# Patient Record
Sex: MALE | Race: WHITE | NOT HISPANIC OR LATINO | Employment: FULL TIME | URBAN - METROPOLITAN AREA
[De-identification: names, ages, dates, MRNs, and addresses within clinical notes are randomized per-mention and may not be internally consistent; named-entity substitution may affect disease eponyms.]

---

## 2018-07-13 ENCOUNTER — PATIENT MESSAGE (OUTPATIENT)
Dept: FAMILY MEDICINE | Facility: CLINIC | Age: 23
End: 2018-07-13

## 2018-07-13 DIAGNOSIS — I86.1 VARICOCELE: Primary | ICD-10-CM

## 2018-07-27 ENCOUNTER — OFFICE VISIT (OUTPATIENT)
Dept: UROLOGY | Facility: CLINIC | Age: 23
End: 2018-07-27
Payer: COMMERCIAL

## 2018-07-27 VITALS
HEART RATE: 64 BPM | HEIGHT: 75 IN | SYSTOLIC BLOOD PRESSURE: 133 MMHG | WEIGHT: 179.69 LBS | BODY MASS INDEX: 22.34 KG/M2 | DIASTOLIC BLOOD PRESSURE: 82 MMHG

## 2018-07-27 DIAGNOSIS — I86.1 VARICOCELE: Primary | ICD-10-CM

## 2018-07-27 LAB
BILIRUB SERPL-MCNC: NORMAL MG/DL
BLOOD URINE, POC: NORMAL
COLOR, POC UA: YELLOW
GLUCOSE UR QL STRIP: NORMAL
KETONES UR QL STRIP: NORMAL
LEUKOCYTE ESTERASE URINE, POC: NORMAL
NITRITE, POC UA: NORMAL
PH, POC UA: 6
PROTEIN, POC: NORMAL
SPECIFIC GRAVITY, POC UA: >1.03
UROBILINOGEN, POC UA: NORMAL

## 2018-07-27 PROCEDURE — 81002 URINALYSIS NONAUTO W/O SCOPE: CPT | Mod: S$GLB,,, | Performed by: UROLOGY

## 2018-07-27 PROCEDURE — 99204 OFFICE O/P NEW MOD 45 MIN: CPT | Mod: 25,S$GLB,, | Performed by: UROLOGY

## 2018-07-27 PROCEDURE — 3008F BODY MASS INDEX DOCD: CPT | Mod: CPTII,S$GLB,, | Performed by: UROLOGY

## 2018-07-27 PROCEDURE — 99999 PR PBB SHADOW E&M-EST. PATIENT-LVL III: CPT | Mod: PBBFAC,,, | Performed by: UROLOGY

## 2018-07-27 NOTE — PROGRESS NOTES
[facility]Y [place]  7 27 18    Cc varicocele    Age 22 , comes in that he has a varicocele that 'has begun to bother him'. Says he was diagnosed during high school with varicocele but it rarely caused any discomfort. Over the years he notices more pressure feeling on the L hemiscrotum, especially if he has long car rides or if he stands for a long time. The discomfort is a dull pressure feeling, and not any stinging pain.    Has no voiding or otherwise any urologic problems.      PMH    Surgical:  has a past surgical history that includes Circumcision.    Medical:  has a past medical history of Varicocele.    Familial: no fh of renal stones    Social: single, lives in [place]    Meds: None    REVIEW OF SYSTEMS  GENERAL: No headaches or dizzy spells.   HEENT: vision preserved. Sinuses: No complaints.   CARDIOPULMONARY: No swelling of the legs; no chest pain. No shortness of breath, no wheezing.   GASTROINTESTINAL: No heartburn, diarrhea, constipation  GENITOURINARY: Denies dysuria, bleeding or incontinence.   MUSCULOSKELETAL: No arthritic complaints such as pain or stiffness.   PSYCHIATRIC: No history of depression or anxiety.   ENDOCRINOLOGIC: No reports of sweating, cold or heat intolerance. No polyuria or polydipsia.   NEUROLOGICAL: No dizziness, syncope, paralysis  LYMPHATICS: No enlarged nodes. No history of splenectomy.  ==    Pt alert, oriented, no distress  HEENT: wnl.  Neck: supple, no JVD, no lymphadenopathy  Chest: CV NSR  Lungs: normal chest expansion, no labored breathing  Abdomen flat, markedly athletic, nontender, no organomegaly, no masses.  No hernias  Penis circumcised, meatus nl  Testes normal bilaterally, epididymides normal bilaterally. The L hemiscrotum has dilated veins, visible and bulging; coughing shows the valsalva pressure effect on the veins by palpation; nontender. No skin changes. Veins are distended in orthostatism but the venous bulge disappears in recumbency.  Extremities: no edema,  peripheral pulses nl  Neuro: preserved    IMP  L varicocele  We discussed possible surgical correction; we would access the vein inguinally for ligation; pt understands and will let us know. In the meantime, pt can wear a supportive underwear.

## 2018-10-09 ENCOUNTER — TELEPHONE (OUTPATIENT)
Dept: FAMILY MEDICINE | Facility: CLINIC | Age: 23
End: 2018-10-09

## 2018-10-09 NOTE — TELEPHONE ENCOUNTER
Phoned pt in regards to message. Do not see any notations that pt would need to R/S. Instructed pt that he would receive a call in regards to R/S appt. If he did not hear from us then he would keep the scheduled appt on 10/31/18. Pt voiced understanding. CLC

## 2018-10-09 NOTE — TELEPHONE ENCOUNTER
----- Message from Doris Richard sent at 10/8/2018  1:54 PM CDT -----  Contact: Self  Patient was  told to call back and get on an afternoon schedule for his appt on October 31 instead of the morning   No appts available in the afternoon   Please call back [phone]

## 2018-10-31 ENCOUNTER — OFFICE VISIT (OUTPATIENT)
Dept: FAMILY MEDICINE | Facility: CLINIC | Age: 23
End: 2018-10-31
Payer: COMMERCIAL

## 2018-10-31 VITALS
SYSTOLIC BLOOD PRESSURE: 104 MMHG | BODY MASS INDEX: 22.29 KG/M2 | HEIGHT: 75 IN | WEIGHT: 179.25 LBS | DIASTOLIC BLOOD PRESSURE: 72 MMHG | HEART RATE: 62 BPM

## 2018-10-31 DIAGNOSIS — Z00.00 ROUTINE HEALTH MAINTENANCE: Primary | ICD-10-CM

## 2018-10-31 PROCEDURE — 99395 PREV VISIT EST AGE 18-39: CPT | Mod: S$GLB,,, | Performed by: FAMILY MEDICINE

## 2018-10-31 PROCEDURE — 99999 PR PBB SHADOW E&M-EST. PATIENT-LVL III: CPT | Mod: PBBFAC,,, | Performed by: FAMILY MEDICINE

## 2018-10-31 NOTE — PROGRESS NOTES
HPI  Andreas Rogers is a 23 y.o. male with multiple medical diagnoses as listed in the medical history and problem list that presents for Annual Exam  .      HPI  Here today for routine health maintenance.    PAST MEDICAL HISTORY:  Past Medical History:   Diagnosis Date    Varicocele        PAST SURGICAL HISTORY:  Past Surgical History:   Procedure Laterality Date    CIRCUMCISION         SOCIAL HISTORY:  Social History     Socioeconomic History    Marital status: Single     Spouse name: Not on file    Number of children: Not on file    Years of education: Not on file    Highest education level: Not on file   Social Needs    Financial resource strain: Not on file    Food insecurity - worry: Not on file    Food insecurity - inability: Not on file    Transportation needs - medical: Not on file    Transportation needs - non-medical: Not on file   Occupational History    Not on file   Tobacco Use    Smoking status: Never Smoker    Smokeless tobacco: Never Used   Substance and Sexual Activity    Alcohol use: Yes     Alcohol/week: 2.4 oz     Types: 4 Cans of beer per week     Comment: Occasional    Drug use: Not on file    Sexual activity: Not on file   Other Topics Concern    Not on file   Social History Narrative    Not on file       FAMILY HISTORY:  Family History   Problem Relation Age of Onset    Kidney cancer Maternal Grandfather     Hypertension Paternal Grandmother     Cancer Paternal Grandfather     Nephrolithiasis Neg Hx        ALLERGIES AND MEDICATIONS: updated and reviewed.  Review of patient's allergies indicates:   Allergen Reactions    Penicillins Hives     No current outpatient medications on file.     No current facility-administered medications for this visit.        ROS  Review of Systems   Constitutional: Negative for activity change and unexpected weight change.   HENT: Negative for hearing loss, rhinorrhea and trouble swallowing.    Eyes: Negative for discharge and visual  "disturbance.   Respiratory: Negative for chest tightness and wheezing.    Cardiovascular: Negative for chest pain and palpitations.   Gastrointestinal: Negative for blood in stool, constipation, diarrhea and vomiting.   Endocrine: Negative for polydipsia and polyuria.   Genitourinary: Negative for difficulty urinating, hematuria and urgency.        Has seen Urology for varicocele   Musculoskeletal: Negative for arthralgias, joint swelling and neck pain.   Neurological: Negative for weakness and headaches.   Psychiatric/Behavioral: Negative for confusion and dysphoric mood.       Physical Exam  Vitals:    10/31/18 0835   BP: 104/72   Pulse: 62    Body mass index is 22.4 kg/m².  Weight: 81.3 kg (179 lb 3.7 oz)   Height: 6' 3" (190.5 cm)     Physical Exam   Constitutional: He is oriented to person, place, and time. He appears well-developed and well-nourished. No distress.   HENT:   Head: Normocephalic and atraumatic.   Right Ear: External ear normal.   Left Ear: External ear normal.   Eyes: Conjunctivae and EOM are normal. Pupils are equal, round, and reactive to light. No scleral icterus.   Neck: Normal range of motion. Neck supple. No JVD present. No thyromegaly present.   Cardiovascular: Normal rate, regular rhythm and intact distal pulses. Exam reveals no gallop and no friction rub.   No murmur heard.  Pulmonary/Chest: Effort normal and breath sounds normal. He has no wheezes. He has no rales.   Abdominal: Soft. Bowel sounds are normal. He exhibits no distension and no mass. There is no tenderness.   Musculoskeletal: Normal range of motion. He exhibits no edema or tenderness.   Lymphadenopathy:     He has no cervical adenopathy.   Neurological: He is alert and oriented to person, place, and time. No cranial nerve deficit. Coordination normal.   Skin: Skin is warm and dry. No rash noted.   Psychiatric: He has a normal mood and affect.   Vitals reviewed.      Health Maintenance       Date Due Completion Date    " TETANUS VACCINE 07/29/2024 7/29/2014          Assessment & Plan    Routine health maintenance  - Health maintenance reviewed  - Diet and exercise education.        Follow-up in about 2 years (around 10/31/2020).

## 2020-10-05 ENCOUNTER — PATIENT MESSAGE (OUTPATIENT)
Dept: ADMINISTRATIVE | Facility: HOSPITAL | Age: 25
End: 2020-10-05

## 2021-01-04 ENCOUNTER — PATIENT MESSAGE (OUTPATIENT)
Dept: ADMINISTRATIVE | Facility: HOSPITAL | Age: 26
End: 2021-01-04

## 2021-04-29 ENCOUNTER — PATIENT MESSAGE (OUTPATIENT)
Dept: RESEARCH | Facility: HOSPITAL | Age: 26
End: 2021-04-29

## 2021-07-07 ENCOUNTER — PATIENT MESSAGE (OUTPATIENT)
Dept: ADMINISTRATIVE | Facility: HOSPITAL | Age: 26
End: 2021-07-07

## 2021-11-15 ENCOUNTER — OFFICE VISIT (OUTPATIENT)
Dept: URGENT CARE | Facility: CLINIC | Age: 26
End: 2021-11-15
Payer: COMMERCIAL

## 2021-11-15 VITALS
DIASTOLIC BLOOD PRESSURE: 74 MMHG | SYSTOLIC BLOOD PRESSURE: 125 MMHG | WEIGHT: 201.19 LBS | TEMPERATURE: 99 F | RESPIRATION RATE: 18 BRPM | HEART RATE: 84 BPM | OXYGEN SATURATION: 99 % | HEIGHT: 75 IN | BODY MASS INDEX: 25.02 KG/M2

## 2021-11-15 DIAGNOSIS — Z11.52 ENCOUNTER FOR SCREENING LABORATORY TESTING FOR COVID-19 VIRUS: Primary | ICD-10-CM

## 2021-11-15 PROCEDURE — 99202 OFFICE O/P NEW SF 15 MIN: CPT | Mod: S$GLB,,, | Performed by: NURSE PRACTITIONER

## 2021-11-15 PROCEDURE — 99202 PR OFFICE/OUTPT VISIT, NEW, LEVL II, 15-29 MIN: ICD-10-PCS | Mod: S$GLB,,, | Performed by: NURSE PRACTITIONER

## 2021-11-15 PROCEDURE — U0003 INFECTIOUS AGENT DETECTION BY NUCLEIC ACID (DNA OR RNA); SEVERE ACUTE RESPIRATORY SYNDROME CORONAVIRUS 2 (SARS-COV-2) (CORONAVIRUS DISEASE [COVID-19]), AMPLIFIED PROBE TECHNIQUE, MAKING USE OF HIGH THROUGHPUT TECHNOLOGIES AS DESCRIBED BY CMS-2020-01-R: HCPCS | Performed by: NURSE PRACTITIONER

## 2021-11-15 PROCEDURE — U0005 INFEC AGEN DETEC AMPLI PROBE: HCPCS | Performed by: NURSE PRACTITIONER

## 2021-11-16 LAB — SARS-COV-2 RNA RESP QL NAA+PROBE: NOT DETECTED

## 2022-01-06 ENCOUNTER — OFFICE VISIT (OUTPATIENT)
Dept: FAMILY MEDICINE | Facility: CLINIC | Age: 27
End: 2022-01-06
Payer: COMMERCIAL

## 2022-01-06 VITALS
OXYGEN SATURATION: 100 % | BODY MASS INDEX: 24.37 KG/M2 | DIASTOLIC BLOOD PRESSURE: 78 MMHG | TEMPERATURE: 98 F | HEIGHT: 75 IN | RESPIRATION RATE: 18 BRPM | SYSTOLIC BLOOD PRESSURE: 126 MMHG | WEIGHT: 196 LBS | HEART RATE: 83 BPM

## 2022-01-06 DIAGNOSIS — F10.10 ALCOHOL CONSUMPTION BINGE DRINKING: ICD-10-CM

## 2022-01-06 DIAGNOSIS — R13.14 PHARYNGOESOPHAGEAL DYSPHAGIA: ICD-10-CM

## 2022-01-06 DIAGNOSIS — Z00.00 ANNUAL PHYSICAL EXAM: Primary | ICD-10-CM

## 2022-01-06 DIAGNOSIS — I86.1 VARICOCELE: ICD-10-CM

## 2022-01-06 PROCEDURE — 99395 PREV VISIT EST AGE 18-39: CPT | Mod: S$GLB,,, | Performed by: INTERNAL MEDICINE

## 2022-01-06 PROCEDURE — 99395 PR PREVENTIVE VISIT,EST,18-39: ICD-10-PCS | Mod: S$GLB,,, | Performed by: INTERNAL MEDICINE

## 2022-01-06 NOTE — PATIENT INSTRUCTIONS
If you have labs scheduled at [facility] you need to make an appointment. This is a measure to protect you from covid 19.      Thank you for choosing [facility].     Please fill out the patient experience survey.

## 2022-01-06 NOTE — PROGRESS NOTES
Subjective:      9:16 AM     Patient ID: Andreas Rogers is a 26 y.o. male.    Chief Complaint: wellness and Annual Exam    HPI     Biggest missed medical opportunity according to the patient: binge drinking    Smoking: no  Alcohol:  Sometimes goes to parties on weekend and will drink fiber 6 beers  Street drugs:no    Diet issues:no    Exercise:  Yes    Mood:  Good    Sadness: no    Anhedonia: no    Sleep: good, but works shift work  Snoring: no    Brush teeth:  yes  Floss teeth: no    Wears seatbelts: yes    Abused: no    Advanced care planning: no    CHIEF :COMPLAINT:  Painful varicocele  HPI:  Has had a varicocele since his teens  ONSET:    2 months    ago.   DURATION: . intermittant after lifting  QUALITY/COURSE: .  Improving  LOCATION:  Left scrotum  INTENSITY/SEVERITY: # 6/10 initially, now 1 (on 1 to 10 scale)  CONTEXT/WHEN:          MODIFIERS/TREATMENTS: Taking Medications: ? .     Compliant with Taking Prescribed Medications: ? yes  . Prior X-Rays: no  Prior Labs: no    Movements, medications or activities that worsen the problem:       Movements, medications, or activities that ease the problem:      The below section is positive for the patient ONLY if BOLDED:    SYMPTOMS/RELATED:  Dysuria.  Flank pain.  Nausea.  Vomiting.        Patient has also experienced 1 episode of dysphagia to solids worry could breathe but was chart choking.  Felt was stuck on the left side.        Review of Systems   Constitutional: Negative for fatigue, fever and unexpected weight change.   HENT: Negative for dental problem, hearing loss, nosebleeds, rhinorrhea, tinnitus, trouble swallowing and voice change.    Eyes: Negative for itching and visual disturbance.   Respiratory: Negative for cough, shortness of breath and wheezing.    Cardiovascular: Negative for chest pain and palpitations.   Gastrointestinal: Negative for abdominal pain, blood in stool, constipation, diarrhea, nausea and vomiting.   Endocrine: Negative for cold  "intolerance, heat intolerance, polydipsia and polyphagia.   Genitourinary: Negative for difficulty urinating and dysuria.   Musculoskeletal: Negative for arthralgias.   Allergic/Immunologic: Negative for environmental allergies and immunocompromised state.   Neurological: Negative for dizziness, seizures, weakness, numbness and headaches.   Hematological: Does not bruise/bleed easily.   Psychiatric/Behavioral: Negative for agitation, dysphoric mood, sleep disturbance and suicidal ideas. The patient is not nervous/anxious.          Objective:      Vitals:    01/06/22 0911   BP: 126/78   Pulse: 83   Resp: 18   Temp: 98.4 °F (36.9 °C)   SpO2: 100%   Weight: 88.9 kg (196 lb)   Height: 6' 3" (1.905 m)   PainSc: 0-No pain     Physical Exam  Vitals and nursing note reviewed.   Constitutional:       Appearance: He is well-developed and well-nourished.   HENT:      Right Ear: Tympanic membrane and external ear normal.      Left Ear: Tympanic membrane and external ear normal.      Mouth/Throat:      Mouth: Oropharynx is clear and moist.      Pharynx: No oropharyngeal exudate.   Eyes:      Pupils: Pupils are equal, round, and reactive to light.   Cardiovascular:      Rate and Rhythm: Normal rate and regular rhythm.      Heart sounds: Normal heart sounds. No murmur heard.  No gallop.    Pulmonary:      Effort: Pulmonary effort is normal.      Breath sounds: Normal breath sounds. No wheezing or rales.   Chest:      Chest wall: No tenderness.   Abdominal:      Palpations: Abdomen is soft.      Tenderness: There is no abdominal tenderness.   Genitourinary:     Comments: Large nontender varicocele of the left scrotum.  Testes are normal  Musculoskeletal:         General: No edema.   Lymphadenopathy:      Cervical: No cervical adenopathy.   Neurological:      Mental Status: He is alert.   Psychiatric:         Mood and Affect: Mood and affect normal.         Behavior: Behavior normal.         Thought Content: Thought content normal. "       No results found for this or any previous visit (from the past 1008 hour(s)).       Assessment:       1. Annual physical exam    2. Varicocele    3. Pharyngoesophageal dysphagia    4. Alcohol consumption binge drinking          Plan:       Annual physical exam    Varicocele  -     Ambulatory referral/consult to Urology; Future; Expected date: 01/13/2022    Pharyngoesophageal dysphagia  -     Ambulatory referral/consult to Gastroenterology; Future; Expected date: 01/13/2022    Alcohol consumption binge drinking      No follow-ups on file.

## 2022-01-24 ENCOUNTER — PATIENT MESSAGE (OUTPATIENT)
Dept: UROLOGY | Facility: CLINIC | Age: 27
End: 2022-01-24
Payer: COMMERCIAL

## 2022-01-25 ENCOUNTER — LAB VISIT (OUTPATIENT)
Dept: LAB | Facility: HOSPITAL | Age: 27
End: 2022-01-25
Payer: COMMERCIAL

## 2022-01-25 ENCOUNTER — OFFICE VISIT (OUTPATIENT)
Dept: GASTROENTEROLOGY | Facility: CLINIC | Age: 27
End: 2022-01-25
Payer: COMMERCIAL

## 2022-01-25 VITALS
SYSTOLIC BLOOD PRESSURE: 126 MMHG | HEART RATE: 64 BPM | WEIGHT: 196.63 LBS | BODY MASS INDEX: 24.58 KG/M2 | DIASTOLIC BLOOD PRESSURE: 78 MMHG

## 2022-01-25 DIAGNOSIS — R13.10 DYSPHAGIA, UNSPECIFIED TYPE: Primary | ICD-10-CM

## 2022-01-25 DIAGNOSIS — R12 HEARTBURN: ICD-10-CM

## 2022-01-25 DIAGNOSIS — R13.14 PHARYNGOESOPHAGEAL DYSPHAGIA: ICD-10-CM

## 2022-01-25 LAB
ALBUMIN SERPL BCP-MCNC: 4.6 G/DL (ref 3.5–5.2)
ALP SERPL-CCNC: 70 U/L (ref 55–135)
ALT SERPL W/O P-5'-P-CCNC: 20 U/L (ref 10–44)
ANION GAP SERPL CALC-SCNC: 11 MMOL/L (ref 8–16)
AST SERPL-CCNC: 15 U/L (ref 10–40)
BASOPHILS # BLD AUTO: 0.02 K/UL (ref 0–0.2)
BASOPHILS NFR BLD: 0.4 % (ref 0–1.9)
BILIRUB SERPL-MCNC: 0.6 MG/DL (ref 0.1–1)
BUN SERPL-MCNC: 15 MG/DL (ref 6–20)
CALCIUM SERPL-MCNC: 9.9 MG/DL (ref 8.7–10.5)
CHLORIDE SERPL-SCNC: 105 MMOL/L (ref 95–110)
CO2 SERPL-SCNC: 25 MMOL/L (ref 23–29)
CREAT SERPL-MCNC: 1.1 MG/DL (ref 0.5–1.4)
DIFFERENTIAL METHOD: NORMAL
EOSINOPHIL # BLD AUTO: 0.3 K/UL (ref 0–0.5)
EOSINOPHIL NFR BLD: 4.6 % (ref 0–8)
ERYTHROCYTE [DISTWIDTH] IN BLOOD BY AUTOMATED COUNT: 11.8 % (ref 11.5–14.5)
EST. GFR  (AFRICAN AMERICAN): >60 ML/MIN/1.73 M^2
EST. GFR  (NON AFRICAN AMERICAN): >60 ML/MIN/1.73 M^2
GLUCOSE SERPL-MCNC: 100 MG/DL (ref 70–110)
HCT VFR BLD AUTO: 45.1 % (ref 40–54)
HGB BLD-MCNC: 15 G/DL (ref 14–18)
IMM GRANULOCYTES # BLD AUTO: 0.01 K/UL (ref 0–0.04)
IMM GRANULOCYTES NFR BLD AUTO: 0.2 % (ref 0–0.5)
LYMPHOCYTES # BLD AUTO: 2.1 K/UL (ref 1–4.8)
LYMPHOCYTES NFR BLD: 37.5 % (ref 18–48)
MCH RBC QN AUTO: 30.5 PG (ref 27–31)
MCHC RBC AUTO-ENTMCNC: 33.3 G/DL (ref 32–36)
MCV RBC AUTO: 92 FL (ref 82–98)
MONOCYTES # BLD AUTO: 0.5 K/UL (ref 0.3–1)
MONOCYTES NFR BLD: 8.5 % (ref 4–15)
NEUTROPHILS # BLD AUTO: 2.8 K/UL (ref 1.8–7.7)
NEUTROPHILS NFR BLD: 48.8 % (ref 38–73)
NRBC BLD-RTO: 0 /100 WBC
PLATELET # BLD AUTO: 352 K/UL (ref 150–450)
PMV BLD AUTO: 9.6 FL (ref 9.2–12.9)
POTASSIUM SERPL-SCNC: 4 MMOL/L (ref 3.5–5.1)
PROT SERPL-MCNC: 7.4 G/DL (ref 6–8.4)
RBC # BLD AUTO: 4.91 M/UL (ref 4.6–6.2)
SODIUM SERPL-SCNC: 141 MMOL/L (ref 136–145)
WBC # BLD AUTO: 5.65 K/UL (ref 3.9–12.7)

## 2022-01-25 PROCEDURE — 99204 OFFICE O/P NEW MOD 45 MIN: CPT | Mod: S$GLB,,,

## 2022-01-25 PROCEDURE — 85025 COMPLETE CBC W/AUTO DIFF WBC: CPT

## 2022-01-25 PROCEDURE — 99999 PR PBB SHADOW E&M-EST. PATIENT-LVL IV: ICD-10-PCS | Mod: PBBFAC,,,

## 2022-01-25 PROCEDURE — 99204 PR OFFICE/OUTPT VISIT, NEW, LEVL IV, 45-59 MIN: ICD-10-PCS | Mod: S$GLB,,,

## 2022-01-25 PROCEDURE — 36415 COLL VENOUS BLD VENIPUNCTURE: CPT

## 2022-01-25 PROCEDURE — 80053 COMPREHEN METABOLIC PANEL: CPT

## 2022-01-25 PROCEDURE — 99999 PR PBB SHADOW E&M-EST. PATIENT-LVL IV: CPT | Mod: PBBFAC,,,

## 2022-01-25 RX ORDER — OMEPRAZOLE 40 MG/1
40 CAPSULE, DELAYED RELEASE ORAL DAILY
Qty: 30 CAPSULE | Refills: 0 | Status: SHIPPED | OUTPATIENT
Start: 2022-01-25 | End: 2022-02-16

## 2022-01-25 NOTE — H&P (VIEW-ONLY)
Subjective:       Patient ID: Andreas Rogers is a 26 y.o. male Body mass index is 24.58 kg/m².    Chief Complaint: Gastroesophageal Reflux    This patient is new to me.  Referring Provider:  Dr. Rogers for pharyngoesophageal dysphagia.     Gastroesophageal Reflux  He complains of dysphagia (occurs with solid foods and meat; feels as though food is stuck in throat; reports having 1 episode this month while eating) and heartburn (rare). He reports no abdominal pain, no belching, no chest pain, no choking, no coughing, no early satiety, no globus sensation, no hoarse voice, no nausea, no sore throat, no stridor, no water brash or no wheezing. This is a chronic (started over a year ago) problem. Episode onset: has worsened the past 2-3 months. The problem occurs occasionally. The problem has been gradually worsening. The heartburn duration is several minutes. The heartburn is located in the substernum. The heartburn is of mild intensity. The heartburn does not wake him from sleep. The heartburn does not limit his activity. The heartburn doesn't change with position. The symptoms are aggravated by certain foods (worse after eating greasy or spicy). Pertinent negatives include no anemia, fatigue, melena, muscle weakness, orthopnea or weight loss. Risk factors include NSAIDs (currently taking Advil/Aleve OTC PRN 1-2 times month). He has tried nothing for the symptoms. Past procedures do not include an abdominal ultrasound, an EGD, esophageal manometry, esophageal pH monitoring, H. pylori antibody titer or a UGI. Past invasive treatments do not include gastroplasty, gastroplication or reflux surgery.     Review of Systems   Constitutional: Negative for activity change, appetite change, chills, diaphoresis, fatigue, fever, unexpected weight change and weight loss.   HENT: Positive for trouble swallowing. Negative for hoarse voice and sore throat.    Respiratory: Negative for cough, choking, shortness of breath and wheezing.     Cardiovascular: Negative for chest pain.   Gastrointestinal: Positive for dysphagia (occurs with solid foods and meat; feels as though food is stuck in throat; reports having 1 episode this month while eating) and heartburn (rare). Negative for abdominal distention, abdominal pain, anal bleeding, blood in stool, constipation, diarrhea, melena, nausea, rectal pain and vomiting.   Musculoskeletal: Negative for muscle weakness.       No LMP for male patient.  Past Medical History:   Diagnosis Date    Varicocele      Past Surgical History:   Procedure Laterality Date    CIRCUMCISION       Family History   Problem Relation Age of Onset    Kidney cancer Maternal Grandfather     Hypertension Paternal Grandmother     Cancer Paternal Grandfather     Nephrolithiasis Neg Hx     Colon cancer Neg Hx     Colon polyps Neg Hx     Crohn's disease Neg Hx     Ulcerative colitis Neg Hx     Stomach cancer Neg Hx     Rectal cancer Neg Hx      Social History     Tobacco Use    Smoking status: Never Smoker    Smokeless tobacco: Never Used   Substance Use Topics    Alcohol use: Yes     Alcohol/week: 4.0 standard drinks     Types: 4 Cans of beer per week     Comment: Occasional     Wt Readings from Last 10 Encounters:   01/25/22 89.2 kg (196 lb 10.4 oz)   01/06/22 88.9 kg (196 lb)   11/15/21 91.3 kg (201 lb 3.2 oz)   10/31/18 81.3 kg (179 lb 3.7 oz)   07/27/18 81.5 kg (179 lb 11.2 oz)   08/30/16 77.3 kg (170 lb 6.7 oz)   08/12/15 78 kg (171 lb 15.3 oz)   07/29/14 72.4 kg (159 lb 9.6 oz)   06/21/11 68.2 kg (150 lb 7.1 oz) (56 %, Z= 0.16)*     * Growth percentiles are based on CDC (Boys, 2-20 Years) data.     Lab Results   Component Value Date    WBC error 06/30/2008    HGB error 06/30/2008    HCT error 06/30/2008    MCV error 06/30/2008    PLT error 06/30/2008     CMP  Sodium   Date Value Ref Range Status   08/13/2015 138 136 - 145 mmol/L Final     Potassium   Date Value Ref Range Status   08/13/2015 4.5 3.5 - 5.1 mmol/L  Final     Chloride   Date Value Ref Range Status   08/13/2015 106 95 - 110 mmol/L Final     CO2   Date Value Ref Range Status   08/13/2015 27 23 - 29 mmol/L Final     Glucose   Date Value Ref Range Status   08/13/2015 88 70 - 110 mg/dL Final     BUN   Date Value Ref Range Status   08/13/2015 20 6 - 20 mg/dL Final     Creatinine   Date Value Ref Range Status   08/13/2015 1.1 0.5 - 1.4 mg/dL Final     Calcium   Date Value Ref Range Status   08/13/2015 9.8 8.7 - 10.5 mg/dL Final     Total Protein   Date Value Ref Range Status   08/13/2015 7.1 6.0 - 8.4 g/dL Final     Albumin   Date Value Ref Range Status   08/13/2015 4.4 3.5 - 5.2 g/dL Final     Total Bilirubin   Date Value Ref Range Status   08/13/2015 0.7 0.1 - 1.0 mg/dL Final     Comment:     For infants and newborns, interpretation of results should be based  on gestational age, weight and in agreement with clinical  observations.  Premature Infant recommended reference ranges:  Up to 24 hours.............<8.0 mg/dL  Up to 48 hours............<12.0 mg/dL  3-5 days..................<15.0 mg/dL  6-29 days.................<15.0 mg/dL       Alkaline Phosphatase   Date Value Ref Range Status   08/13/2015 72 55 - 135 U/L Final     AST   Date Value Ref Range Status   08/13/2015 18 10 - 40 U/L Final     ALT   Date Value Ref Range Status   08/13/2015 13 10 - 44 U/L Final     Anion Gap   Date Value Ref Range Status   08/13/2015 5 (L) 8 - 16 mmol/L Final     eGFR if    Date Value Ref Range Status   08/13/2015 >60.0 >60 mL/min/1.73 m^2 Final     eGFR if non    Date Value Ref Range Status   08/13/2015 >60.0 >60 mL/min/1.73 m^2 Final     Comment:     Calculation used to obtain the estimated glomerular filtration  rate (eGFR) is the CKD-EPI equation. Since race is unknown   in our information system, the eGFR values for   -American and Non--American patients are given   for each creatinine result.         Objective:      Physical  Exam  Vitals and nursing note reviewed.   Constitutional:       General: He is not in acute distress.     Appearance: Normal appearance. He is normal weight. He is not ill-appearing.   HENT:      Mouth/Throat:      Comments: Unable to assess due to COVID-19 concerns.  Eyes:      Extraocular Movements: Extraocular movements intact.      Pupils: Pupils are equal, round, and reactive to light.   Cardiovascular:      Rate and Rhythm: Normal rate and regular rhythm.      Heart sounds: Normal heart sounds.   Pulmonary:      Effort: Pulmonary effort is normal. No respiratory distress.      Breath sounds: Normal breath sounds.   Abdominal:      General: Abdomen is flat. Bowel sounds are normal. There is no distension or abdominal bruit. There are no signs of injury.      Palpations: Abdomen is soft. There is no shifting dullness, fluid wave, hepatomegaly or mass.      Tenderness: There is no abdominal tenderness. There is no guarding or rebound. Negative signs include Luna's sign and McBurney's sign.      Hernia: No hernia is present.   Skin:     General: Skin is warm and dry.      Coloration: Skin is not jaundiced.   Neurological:      Mental Status: He is alert and oriented to person, place, and time.   Psychiatric:         Attention and Perception: Attention normal.         Mood and Affect: Mood normal.         Speech: Speech normal.         Behavior: Behavior normal.         Assessment:       1. Dysphagia, unspecified type    2. Heartburn        Plan:       Dysphagia, unspecified type  - schedule EGD, discussed procedure with patient and possible esophageal dilation may be performed during procedure if indicated, patient verbalized understanding  - educated patient to eat smaller more frequent meals and to eat slowly and advised to eat a soft diet.  - possible UGI/esophagram/esophageal manometry if symptoms persist  -     CBC Auto Differential; Future; Expected date: 01/25/2022  -     Comprehensive Metabolic Panel;  Future; Expected date: 01/25/2022  - START: omeprazole (PRILOSEC) 40 MG capsule; Take 1 capsule (40 mg total) by mouth once daily.  Dispense: 30 capsule; Refill: 0    Heartburn  - schedule EGD, discussed procedure with patient, including risks and benefits, patient verbalized understanding  -discussed about the different types of medications used to treat reflux and how to use them, antacids can be used PRN for breakthrough heartburn symptoms by reducing stomach acid that is already produced, H2 blockers work by limiting the amount acid production, & PPI's work to block acid production and are taken daily, patient verbalized understanding.  -Educated patient on lifestyle modifications to help control/reduce reflux/abdominal pain including: avoid large meals, avoid eating within 2-3 hours of bedtime (avoid late night eating & lying down soon after eating), elevate head of bed if nocturnal symptoms are present, smoking cessation (if current smoker), & weight loss (if overweight).   -Educated to avoid known foods which trigger reflux symptoms & to minimize/avoid high-fat foods, chocolate, caffeine, citrus, alcohol, & tomato products.  -Advised to avoid/limit use of NSAID's, since they can cause GI upset, bleeding, and/or ulcers. If needed, take with food.   -     CBC Auto Differential; Future; Expected date: 01/25/2022  -     Comprehensive Metabolic Panel; Future; Expected date: 01/25/2022  - START: omeprazole (PRILOSEC) 40 MG capsule; Take 1 capsule (40 mg total) by mouth once daily.  Dispense: 30 capsule; Refill: 0      Follow up in about 4 weeks (around 2/22/2022), or if symptoms worsen or fail to improve.      If no improvement in symptoms or symptoms worsen, call/follow-up at clinic or go to ER.        30 minutes of total time spent on the encounter, which includes face to face time and non-face to face time preparing to see the patient (eg, review of tests), Obtaining and/or reviewing separately obtained history,  Documenting clinical information in the electronic or other health record, Independently interpreting results (not separately reported) and communicating results to the patient/family/caregiver, or Care coordination (not separately reported).

## 2022-01-25 NOTE — PATIENT INSTRUCTIONS
"Patient Education       Dysphagia   The Basics   Written by the doctors and editors at [facility]   What is dysphagia? -- Dysphagia is the medical term for "trouble swallowing." Sometimes, dysphagia happens if you eat too fast or don't chew your food well enough. But if you have dysphagia, you might have a serious medical problem that needs to be treated right away.  What causes dysphagia? -- Dysphagia is usually caused by a problem in the upper part of your digestive tract (figure 1). Often, the problem is in the esophagus, the tube that connects your mouth to your stomach. But it can also happen because of a problem in the mouth or throat.  What are the symptoms of dysphagia? -- The symptoms include:  · Not being able to swallow  · Pain while swallowing  · Feeling like food is stuck in your throat or chest  · Coughing or gagging while swallowing  · Drooling  · Trouble speaking  Should I see a doctor or nurse? -- See your doctor or nurse if you have any of the above symptoms. Do so right away if you are unable to swallow your own saliva and are drooling or have trouble speaking.  Are there tests for dysphagia? -- Yes. Your doctor or nurse will do an exam and ask about your symptoms. Other test might include:  · A barium X-ray - For this test, you drink a thick liquid called a "barium solution." It coats the inside of your esophagus. The barium shows up on X-rays, so the doctor can see any problems in the esophagus.  · A swallowing study - For this test, you eat different foods coated with barium. X-rays taken throughout the test show if you have problems with the muscles in your mouth or throat. This test is also called a "videofluoroscopy."  · Upper endoscopy - For this test, the doctor puts a thin, flexible tube into your mouth, down your throat, and into your esophagus. The tube (called an endoscope) has a camera and a light on it, so it allows the doctor to see inside the esophagus.  · Manometry - This test " measures the pressure at different places inside your esophagus. To do this, a small tube that senses pressure is inserted through your nose, down your throat, and into your esophagus. The results of the test can tell your doctor how well the muscles that help you swallow are working.  How is dysphagia treated? -- The treatment depends on what is causing the dysphagia.  If you have dysphagia because of a problem in your mouth and the upper part of your throat, your doctor might refer you to a speech or swallowing specialist. This person can teach you exercises to help you swallow, and might also suggest ways to modify your diet.  If the problem affects your esophagus, treatments can include:  · Esophageal dilation - For this procedure, the doctor uses an endoscope (see above) with a special balloon on the end to gently stretch and widen your esophagus.  · Surgery - Doctors can do surgery to remove any tumors or other abnormal tissue in the esophagus.  · Medicines - Medicines used to treat dysphagia include:  ? Medicines that reduce stomach acid, such as proton pump inhibitors (table 1)  ? Medicines to treat an infection of the esophagus.  All topics are updated as new evidence becomes available and our peer review process is complete.  This topic retrieved from [employer] on: Sep 21, 2021.  Topic 14253 Version 10.0  Release: 29.4.2 - C29.263  © 2021 UpToDate, Inc. and/or its affiliates. All rights reserved.  figure 1: Upper digestive tract     The upper digestive tract includes the esophagus (the tube that connects the mouth to the stomach), the stomach, and the duodenum (the first part of the small intestine).  Graphic 20855 Version 6.0    table 1: Medicines used to reduce stomach acid  Medicine type  Medicine name examples    Antacids* Calcium carbonate (sample brand names: Maalox, Tums)    Aluminum hydroxide, magnesium hydroxide, and simethicone (sample brand name: Mylanta)   Surface agents Sucralfate (brand name:  Carafate)   Histamine blockers¶  Famotidine (brand name: Pepcid)    Cimetidine (brand name: Tagamet)   Proton pump inhibitors Omeprazole (brand name: Prilosec)    Esomeprazole (brand name: Nexium)    Pantoprazole (brand name: Protonix)    Lansoprazole (brand name: Prevacid)    Dexlansoprazole (brand name: Dexilant)    Rabeprazole (brand name: AcipHex)   USMD 12194 Version 14.0  Consumer Information Use and Disclaimer   This information is not specific medical advice and does not replace information you receive from your health care provider. This is only a brief summary of general information. It does NOT include all information about conditions, illnesses, injuries, tests, procedures, treatments, therapies, discharge instructions or life-style choices that may apply to you. You must talk with your health care provider for complete information about your health and treatment options. This information should not be used to decide whether or not to accept your health care provider's advice, instructions or recommendations. Only your health care provider has the knowledge and training to provide advice that is right for you. The use of this information is governed by the [employer] End User License Agreement, available at https://www.Dropico [employer]9 [employer]/en/solutions/Hightail/about/jaxon.The use of [employer] content is governed by the [employer] Terms of Use. ©2021 UpToDate, Inc. All rights reserved.  Copyright   © 2021 UpToDate, Inc. and/or its affiliates. All rights reserved.

## 2022-02-07 NOTE — PROGRESS NOTES
[facility] Note  Staff: DENNIS CarranzaC    PCP: TATI Rogers    Chief Complaint: Varicocele    Subjective:        HPI: Andreas Rogers is a 26 y.o. male NEW PT presents today for evaluation of chronic varicocele.  Pt states today he has had varicocele since high school with no problems noted up until the past few months.    Pt states today, more prominently with exercise or doing sports, he has increased discomfort within testicle/varicocele area.     HX:  Pt has seen and been evaluated by Dr. Rodríguez in the past regarding same condition.  Last ov was in 2018.    In 2018 ov with MD, pt c/o having a a varicocele that 'had begun to bother him'. Says he was diagnosed during high school with varicocele but it rarely caused any discomfort. Over the years he notices more pressure feeling on the L hemiscrotum, especially if he has long car rides or if he stands for a long time. The discomfort is a dull pressure feeling, and not any stinging pain.     Has no voiding or otherwise any urologic problems.    Family Hx of  Cancer?:  None  Hx of gross hematuria or blood within semen?:  None  Hx of Kidney stones?:  None    REVIEW OF SYSTEMS:  A comprehensive 10 system review was performed and is negative except as noted above in HPI    PMHx:  Past Medical History:   Diagnosis Date    Varicocele      PSHx:  Past Surgical History:   Procedure Laterality Date    CIRCUMCISION       Allergies:  Penicillins    Medications: reviewed   Objective:     Vitals:    02/08/22 1000   BP: 133/75   Pulse: 68     General:WDWN in NAD  Eyes: PERRLA, normal conjunctiva  Respiratory: no increased work on breathing, clear to auscultation  Cardiovascular: regular rate and rhythm. No obvious extremity edema.  GI: palpation of masses. No tenderness. No hepatosplenomegaly to palpation.  Musculoskeletal: normal range of motion of bilateral upper extremities. Normal muscle strength and tone.  Skin: no obvious rashes or  lesions. No tightening of skin noted.  Neurologic: CN grossly normal. Normal sensation.   Psychiatric: awake, alert and oriented x 3. Mood and affect normal. Cooperative.     Exam performed by me in office today:  Deferred    Assessment:       1. Pain in testicle, unspecified laterality    2. Varicocele          Plan:     1.  We will try medication management for his acute symptoms at this time with the following RX'S:  Doxy 100 mg BID x 21 days and Ibuprofen prn pain/discomfort.  The meds prescribed to pt today as trial to see if med improves pt's current LUTS.  Benefits, risks and side affects were thoroughly explained to pt today in office with all questions answered.    2.  The following instructions were given to the patient to assist with discomfort:  -Use jockstrap or the best supportive underwear he can get for relief of pressure.  -Alternate ice packs/heating pad to areas.  -Take OTC anti-inflammatories  -Sit in a warm tub of water greater than 15 minutes  -Elevate Scrotal Sac    Pt was instructed and advised should he have no improvement or worsening of symptoms after the above referenced POC is completed, then will refer to Dr. Salgado for possible Varicocele procedure and US of Scrotum/Testicles at that time.  Pt marito.      [facility]: Active    Ellen Gomez, RICHARD

## 2022-02-08 ENCOUNTER — OFFICE VISIT (OUTPATIENT)
Dept: UROLOGY | Facility: CLINIC | Age: 27
End: 2022-02-08
Payer: COMMERCIAL

## 2022-02-08 VITALS
DIASTOLIC BLOOD PRESSURE: 75 MMHG | HEIGHT: 75 IN | SYSTOLIC BLOOD PRESSURE: 133 MMHG | WEIGHT: 198.63 LBS | HEART RATE: 68 BPM | BODY MASS INDEX: 24.7 KG/M2

## 2022-02-08 DIAGNOSIS — I86.1 VARICOCELE: ICD-10-CM

## 2022-02-08 DIAGNOSIS — N50.819 PAIN IN TESTICLE, UNSPECIFIED LATERALITY: Primary | ICD-10-CM

## 2022-02-08 PROCEDURE — 99999 PR PBB SHADOW E&M-EST. PATIENT-LVL III: ICD-10-PCS | Mod: PBBFAC,,, | Performed by: NURSE PRACTITIONER

## 2022-02-08 PROCEDURE — 99999 PR PBB SHADOW E&M-EST. PATIENT-LVL III: CPT | Mod: PBBFAC,,, | Performed by: NURSE PRACTITIONER

## 2022-02-08 PROCEDURE — 99203 OFFICE O/P NEW LOW 30 MIN: CPT | Mod: S$GLB,,, | Performed by: NURSE PRACTITIONER

## 2022-02-08 PROCEDURE — 99203 PR OFFICE/OUTPT VISIT, NEW, LEVL III, 30-44 MIN: ICD-10-PCS | Mod: S$GLB,,, | Performed by: NURSE PRACTITIONER

## 2022-02-08 RX ORDER — DOXYCYCLINE 100 MG/1
100 CAPSULE ORAL 2 TIMES DAILY
Qty: 42 CAPSULE | Refills: 0 | Status: SHIPPED | OUTPATIENT
Start: 2022-02-08 | End: 2022-03-01

## 2022-02-08 RX ORDER — IBUPROFEN 800 MG/1
800 TABLET ORAL 3 TIMES DAILY
Qty: 90 TABLET | Refills: 0 | Status: SHIPPED | OUTPATIENT
Start: 2022-02-08 | End: 2022-03-10

## 2022-02-08 NOTE — PATIENT INSTRUCTIONS
Patient Education       Hydrocele/Varicocele Discharge Instructions   About this topic   The male sex organs are made up of different parts. The testes are small round organs that make sperm. The testes are located in the skin sac that hangs between a man's legs. The skin sac is also called the scrotum.  A hydrocele is a collection of fluid inside the scrotum around the testicle. In babies, the fluid may come from a hole between the stomach and scrotum that did not close. Fluid may be present at birth or start collecting a few months later. In adults, the fluid usually happens because of trauma or infection and does not move between the scrotum and the stomach.     A varicocele happens when the valves in the veins that drain the scrotum and testicles cannot do their job well. Blood doesn't flow well out of the scrotum. The excess blood makes the vessels swell. The swelling may look like a bag of worms. A varicocele happens more often on the left testicle.  The doctor may not treat either of these conditions right away. The hole causing the hydrocele may close by itself in young babies. Varicoceles may be small and not causing other problems. Other times, doctors will use a procedure or surgery to treat these conditions.     What care is needed at home?   · Ask your doctor what you need to do when you go home. Make sure you ask questions if you do not understand what you need to do.  · If your doctor drained the fluid using a needle, or if a drug was injected to close the hole, keep the puncture site clean and dry. Change the bandage as needed or as instructed by the doctor.  · If your doctor made an incision to repair the hydrocele or varicocele, be sure to ask how to take care of this before you go home.  · Place an ice pack or a bag of frozen peas wrapped in a towel over the painful part. Never put ice right on the skin. Do not leave the ice on more than 10 to 15 minutes at a time. Use an ice pack each hour for  the first few days.  · Wear supporting underwear, like a jock strap or snug briefs, to support the scrotum.  · Avoid any heavy activity and lifting for 7 to 10 days after a surgery or procedure.  What follow-up care is needed?   · Your condition needs close monitoring. Your doctor may ask you to make visits to the office to check on your progress. Be sure to keep your visits.  · Your doctor will tell you if other tests are needed.  · If you are having problems having children, your doctor may send you to a fertility expert.  What drugs may be needed?   The doctor may order drugs to:  · Help with pain and swelling  · Prevent or fight an infection  · Soften stools  Will physical activity be limited?   · Get lots of rest laying on your back. Lying down with your scrotum elevated lessens the pressure of gravity on your scrotum.  · You may have to stop riding things like motorcycles or horses until the swelling goes away. Talk to your doctor about your activities.  · Ask your doctor when you may go back to your normal activities like sports, work, heavy lifting, or sex.  What problems could happen?   · Infection  · Bleeding  · Problems having an erection  · Lower sex drive  · Trouble passing urine  · You may not be able to father a child  · Blood clots  · Injury to scrotum or testicle  · Swelling  · Pain  · Testicles get smaller  · Varicocele or hydrocele comes back  When do I need to call the doctor?   · Signs of infection such as a fever of 100.4°F (38°C) or higher, chills, pain with passing urine.  · Swelling and pain in the scrotum which does not get better after 2 days  · Very bad pain in the groin area  · Bloody semen  · Bloody urine  · Unable to pass urine  · Throwing up  · You are not feeling better in 2 or 3 days or you are feeling worse  Helpful tips   · Look at your scrotum each month. Check for changes in the size, shape, and asymmetry. Feel for lumps and any irregularities.  · Wear a jock strap when playing  contact sports to protect your genital area from injury.  Teach Back: Helping You Understand   The Teach Back Method helps you understand the information we are giving you. After you talk with the staff, tell them in your own words what you learned. This helps to make sure the staff has described each thing clearly. It also helps to explain things that may have been confusing. Before going home, make sure you can do these:  · I can tell you about my condition.  · I can tell you what may help ease my pain.  · I can tell you what I will do if I have a fever, chills, groin pain, or bloody semen or urine.  Where can I learn more?   [employer] [employer]  https://www.baus.org.uk/_userfiles/pages/files/Patients/Leaflets/Hydrocele.pdf   [employer]  https://www.baus.org.uk/_userfiles/pages/files/Patients/Leaflets/Varico%20open.pdf   [facility]  http://[employer].org/teen/sexual_health/guys/varicocele.html   Last Reviewed Date   2019-11-18  Consumer Information Use and Disclaimer   This information is not specific medical advice and does not replace information you receive from your health care provider. This is only a brief summary of general information. It does NOT include all information about conditions, illnesses, injuries, tests, procedures, treatments, therapies, discharge instructions or life-style choices that may apply to you. You must talk with your health care provider for complete information about your health and treatment options. This information should not be used to decide whether or not to accept your health care providers advice, instructions or recommendations. Only your health care provider has the knowledge and training to provide advice that is right for you.  Copyright   Copyright © 2021 UpToDate, Inc. and its affiliates and/or licensors. All rights reserved.  Patient Education       Varicocele   The Basics   Written by the doctors and editors at  [employer]   What is a varicocele? -- A varicocele is a group of swollen veins inside the scrotum. The scrotum is the skin sac that holds the testicles. Varicoceles can happen at any age (figure 1).  What are the symptoms of a varicocele? -- A varicocele does not always cause symptoms. When it does, the symptoms can include:  · Pain - It might hurt when you stand up and feel better when you lie down.  · Feeling as though 1 testicle is heavier than the other  · Swelling in 1 testicle  · Testicles that look different in size  Sometimes, a varicocele can cause problems with fertility. If this happens, you might have trouble getting a partner pregnant.  Is there a test for a varicocele? -- Not usually. If you have a swelling that feels like a varicocele, your doctor or nurse will probably be able to tell what it is by doing an exam and asking about your symptoms.  If the doctor or nurse is not sure what you have, they might order an ultrasound test. This test uses sound waves to create pictures of the inside of the body.  How is a varicocele treated? -- Most varicoceles do not need treatment. When they do, treatment can include:  · Pain medicines, such as ibuprofen (sample brand names: Advil, Motrin), if the varicocele causes discomfort  · Wearing tighter underwear or an athletic supporter (jock strap) to support the scrotum  · Surgery to stop blood from flowing into the swollen veins  All topics are updated as new evidence becomes available and our peer review process is complete.  This topic retrieved from [employer] on: Sep 21, 2021.  Topic 20008 Version 16.0  Release: 29.4.2 - C29.263  © 2021 UpToDate, Inc. and/or its affiliates. All rights reserved.  figure 1: Varicocele     A varicocele is a collection of swollen veins inside the scrotum. These swollen veins make the scrotum look like a bag of worms.  Graphic 16807 Version 3.0    Consumer Information Use and Disclaimer   This information is not specific medical advice  and does not replace information you receive from your health care provider. This is only a brief summary of general information. It does NOT include all information about conditions, illnesses, injuries, tests, procedures, treatments, therapies, discharge instructions or life-style choices that may apply to you. You must talk with your health care provider for complete information about your health and treatment options. This information should not be used to decide whether or not to accept your health care provider's advice, instructions or recommendations. Only your health care provider has the knowledge and training to provide advice that is right for you. The use of this information is governed by the [employer] End User License Agreement, available at https://www.Merfac.Gamisfaction/en/solutions/Burst Media/about/jaxon.The use of [employer] content is governed by the [employer] Terms of Use. ©2021 UpToDate, Inc. All rights reserved.  Copyright   © 2021 UpToDate, Inc. and/or its affiliates. All rights reserved.

## 2022-02-11 ENCOUNTER — ANESTHESIA (OUTPATIENT)
Dept: ENDOSCOPY | Facility: HOSPITAL | Age: 27
End: 2022-02-11
Payer: COMMERCIAL

## 2022-02-11 ENCOUNTER — HOSPITAL ENCOUNTER (OUTPATIENT)
Facility: HOSPITAL | Age: 27
Discharge: HOME | End: 2022-02-11
Attending: INTERNAL MEDICINE | Admitting: INTERNAL MEDICINE
Payer: COMMERCIAL

## 2022-02-11 ENCOUNTER — ANESTHESIA EVENT (OUTPATIENT)
Dept: ENDOSCOPY | Facility: HOSPITAL | Age: 27
End: 2022-02-11
Payer: COMMERCIAL

## 2022-02-11 DIAGNOSIS — K22.2 SCHATZKI'S RING: Primary | ICD-10-CM

## 2022-02-11 DIAGNOSIS — K44.9 HIATAL HERNIA: ICD-10-CM

## 2022-02-11 DIAGNOSIS — R13.10 DYSPHAGIA: ICD-10-CM

## 2022-02-11 PROCEDURE — 88305 TISSUE EXAM BY PATHOLOGIST: ICD-10-PCS | Mod: 26,,, | Performed by: PATHOLOGY

## 2022-02-11 PROCEDURE — 63600175 PHARM REV CODE 636 W HCPCS: Performed by: NURSE ANESTHETIST, CERTIFIED REGISTERED

## 2022-02-11 PROCEDURE — 88305 TISSUE EXAM BY PATHOLOGIST: CPT | Mod: 59 | Performed by: PATHOLOGY

## 2022-02-11 PROCEDURE — C1769 GUIDE WIRE: HCPCS | Performed by: INTERNAL MEDICINE

## 2022-02-11 PROCEDURE — 43248 EGD GUIDE WIRE INSERTION: CPT | Performed by: INTERNAL MEDICINE

## 2022-02-11 PROCEDURE — 25000003 PHARM REV CODE 250: Performed by: INTERNAL MEDICINE

## 2022-02-11 PROCEDURE — D9220A PRA ANESTHESIA: ICD-10-PCS | Mod: ANES,,, | Performed by: ANESTHESIOLOGY

## 2022-02-11 PROCEDURE — D9220A PRA ANESTHESIA: Mod: CRNA,,, | Performed by: NURSE ANESTHETIST, CERTIFIED REGISTERED

## 2022-02-11 PROCEDURE — D9220A PRA ANESTHESIA: Mod: ANES,,, | Performed by: ANESTHESIOLOGY

## 2022-02-11 PROCEDURE — 43239 PR EGD, FLEX, W/BIOPSY, SGL/MULTI: ICD-10-PCS | Mod: 59,,, | Performed by: INTERNAL MEDICINE

## 2022-02-11 PROCEDURE — 27201012 HC FORCEPS, HOT/COLD, DISP: Performed by: INTERNAL MEDICINE

## 2022-02-11 PROCEDURE — 25000003 PHARM REV CODE 250: Performed by: NURSE ANESTHETIST, CERTIFIED REGISTERED

## 2022-02-11 PROCEDURE — 43248 PR EGD, FLEX, W/DILATION OVER GUIDEWIRE: ICD-10-PCS | Mod: ,,, | Performed by: INTERNAL MEDICINE

## 2022-02-11 PROCEDURE — D9220A PRA ANESTHESIA: ICD-10-PCS | Mod: CRNA,,, | Performed by: NURSE ANESTHETIST, CERTIFIED REGISTERED

## 2022-02-11 PROCEDURE — 43248 EGD GUIDE WIRE INSERTION: CPT | Mod: ,,, | Performed by: INTERNAL MEDICINE

## 2022-02-11 PROCEDURE — 43239 EGD BIOPSY SINGLE/MULTIPLE: CPT | Mod: 59,,, | Performed by: INTERNAL MEDICINE

## 2022-02-11 PROCEDURE — 43239 EGD BIOPSY SINGLE/MULTIPLE: CPT | Mod: 59 | Performed by: INTERNAL MEDICINE

## 2022-02-11 PROCEDURE — 37000008 HC ANESTHESIA 1ST 15 MINUTES: Performed by: INTERNAL MEDICINE

## 2022-02-11 PROCEDURE — 88305 TISSUE EXAM BY PATHOLOGIST: CPT | Mod: 26,,, | Performed by: PATHOLOGY

## 2022-02-11 RX ORDER — LIDOCAINE HYDROCHLORIDE 20 MG/ML
INJECTION INTRAVENOUS
Status: DISCONTINUED | OUTPATIENT
Start: 2022-02-11 | End: 2022-02-11

## 2022-02-11 RX ORDER — SODIUM CHLORIDE 9 MG/ML
INJECTION, SOLUTION INTRAVENOUS CONTINUOUS
Status: DISCONTINUED | OUTPATIENT
Start: 2022-02-11 | End: 2022-02-11 | Stop reason: HOSPADM

## 2022-02-11 RX ORDER — PROPOFOL 10 MG/ML
VIAL (ML) INTRAVENOUS
Status: DISCONTINUED | OUTPATIENT
Start: 2022-02-11 | End: 2022-02-11

## 2022-02-11 RX ADMIN — PROPOFOL 40 MG: 10 INJECTION, EMULSION INTRAVENOUS at 12:02

## 2022-02-11 RX ADMIN — LIDOCAINE HYDROCHLORIDE 50 MG: 20 INJECTION, SOLUTION INTRAVENOUS at 12:02

## 2022-02-11 RX ADMIN — PROPOFOL 50 MG: 10 INJECTION, EMULSION INTRAVENOUS at 12:02

## 2022-02-11 RX ADMIN — SODIUM CHLORIDE: 0.9 INJECTION, SOLUTION INTRAVENOUS at 11:02

## 2022-02-11 RX ADMIN — PROPOFOL 120 MG: 10 INJECTION, EMULSION INTRAVENOUS at 12:02

## 2022-02-11 NOTE — ANESTHESIA POSTPROCEDURE EVALUATION
Anesthesia Post Evaluation    Patient: Andreas Rogers    Procedure(s) Performed: Procedure(s) (LRB):  EGD (ESOPHAGOGASTRODUODENOSCOPY) (N/A)    Final Anesthesia Type: general      Patient location during evaluation: PACU  Patient participation: Yes- Able to Participate  Level of consciousness: awake and alert and oriented  Post-procedure vital signs: reviewed and stable  Pain management: adequate  Airway patency: patent    PONV status at discharge: No PONV  Anesthetic complications: no      Cardiovascular status: blood pressure returned to baseline  Respiratory status: unassisted, spontaneous ventilation and room air  Hydration status: euvolemic  Follow-up not needed.          Vitals Value Taken Time   /65 02/11/22 1250   Temp 37 °C (98.6 °F) 02/11/22 1221   Pulse 69 02/11/22 1250   Resp 16 02/11/22 1250   SpO2 98 % 02/11/22 1250         Event Time   Out of Recovery 13:05:17         Pain/Nate Score: Nate Score: 10 (2/11/2022 12:50 PM)        
no

## 2022-02-11 NOTE — TRANSFER OF CARE
Anesthesia Transfer of Care Note    Patient: Andreas Rogers    Procedure(s) Performed: Procedure(s) (LRB):  EGD (ESOPHAGOGASTRODUODENOSCOPY) (N/A)    Patient location: GI    Anesthesia Type: general    Transport from OR: Transported from OR on 2-3 L/min O2 by NC with adequate spontaneous ventilation    Post pain: adequate analgesia    Post assessment: no apparent anesthetic complications    Post vital signs: stable    Level of consciousness: awake    Nausea/Vomiting: no nausea/vomiting    Complications: none    Transfer of care protocol was followed      Last vitals:   Visit Vitals  /67 (BP Location: Left arm, Patient Position: Lying)   Pulse 64   Temp 37 °C (98.6 °F) (Skin)   Resp 16   SpO2 98%

## 2022-02-11 NOTE — ANESTHESIA PREPROCEDURE EVALUATION
02/11/2022  Andreas Rogers is a 26 y.o., male.    Anesthesia Evaluation    I have reviewed the Patient Summary Reports.    I have reviewed the Nursing Notes. I have reviewed the NPO Status.   I have reviewed the Medications.     Review of Systems  Anesthesia Hx:  No problems with previous Anesthesia Denies Hx of Anesthetic complications    Social:  Alcohol Use Alcohol consumption binge drinking  Pharyngoesophageal dysphagia       Cardiovascular:   Denies Hypertension.  Denies MI.  Denies CAD.    Denies CABG/stent.   Denies Angina.    Pulmonary:   Denies COPD.  Denies Asthma.  Denies Recent URI.    Renal/:   Denies Chronic Renal Disease.     Hepatic/GI:   Denies GERD. Denies Liver Disease.    Neurological:   Denies TIA. Denies CVA. Denies Seizures.    Endocrine:   Denies Diabetes. Denies Hypothyroidism.    Psych:   Denies Psychiatric History.          Physical Exam  General:  Well nourished    Airway/Jaw/Neck:  Airway Findings: Mouth Opening: Normal Tongue: Normal  General Airway Assessment: Adult, Good  Mallampati: II  Improves to II with phonation.  TM Distance: 4-6 cm      Dental:  Dental Findings: In tact   Chest/Lungs:  Chest/Lungs Findings: Clear to auscultation, Normal Respiratory Rate     Heart/Vascular:  Heart Findings: Rate: Normal  Rhythm: Regular Rhythm  Sounds: Normal  Heart murmur: negative       Mental Status:  Mental Status Findings:  Cooperative, Alert and Oriented         Anesthesia Plan  Type of Anesthesia, risks & benefits discussed:  Anesthesia Type:  general    Patient's Preference:   Plan Factors:          Intra-op Monitoring Plan: standard ASA monitors  Intra-op Monitoring Plan Comments:   Post Op Pain Control Plan:   Post Op Pain Control Plan Comments:     Induction:   IV  Beta Blocker:  Patient is not currently on a Beta-Blocker (No further documentation required).       Informed  Consent: Patient understands risks and agrees with Anesthesia plan.  Questions answered. Anesthesia consent signed with patient.  ASA Score: 1     Day of Surgery Review of History & Physical: I have interviewed and examined the patient. I have reviewed the patient's H&P dated:  There are no significant changes.  H&P update referred to the surgeon.         Ready For Surgery From Anesthesia Perspective.

## 2022-02-11 NOTE — PROVATION PATIENT INSTRUCTIONS
Discharge Summary/Instructions after an Endoscopic Procedure  Patient Name: Andreas Rogers  Patient MRN: [id number]  Patient YOB: 1995  Friday, February 11, 2022  Raffi Elizabeth MD  Dear patient,  As a result of recent federal legislation (The Federal Cures Act), you may   receive lab or pathology results from your procedure in your [facility]   account before your physician is able to contact you. Your physician or   their representative will relay the results to you with their   recommendations at their soonest availability.  Thank you,  RESTRICTIONS:  During your procedure today, you received medications for sedation.  These   medications may affect your judgment, balance and coordination.  Therefore,   for 24 hours, you have the following restrictions:   - DO NOT drive a car, operate machinery, make legal/financial decisions,   sign important papers or drink alcohol.    ACTIVITY:  Today: no heavy lifting, straining or running due to procedural   sedation/anesthesia.  The following day: return to full activity including work.  DIET:  Eat and drink normally unless instructed otherwise.     TREATMENT FOR COMMON SIDE EFFECTS:  - Mild abdominal pain, nausea, belching, bloating or excessive gas:  rest,   eat lightly and use a heating pad.  - Sore Throat: treat with throat lozenges and/or gargle with warm salt   water.  - Because air was used during the procedure, expelling large amounts of air   from your rectum or belching is normal.  - If a bowel prep was taken, you may not have a bowel movement for 1-3 days.    This is normal.  SYMPTOMS TO WATCH FOR AND REPORT TO YOUR PHYSICIAN:  1. Abdominal pain or bloating, other than gas cramps.  2. Chest pain.  3. Back pain.  4. Signs of infection such as: chills or fever occurring within 24 hours   after the procedure.  5. Rectal bleeding, which would show as bright red, maroon, or black stools.   (A tablespoon of blood from the rectum is not serious, especially  if   hemorrhoids are present.)  6. Vomiting.  7. Weakness or dizziness.  GO DIRECTLY TO THE NEAREST EMERGENCY ROOM IF YOU HAVE ANY OF THE FOLLOWING:      Difficulty breathing              Chills and/or fever over 101 F   Persistent vomiting and/or vomiting blood   Severe abdominal pain   Severe chest pain   Black, tarry stools   Bleeding- more than one tablespoon   Any other symptom or condition that you feel may need urgent attention  Your doctor recommends these additional instructions:  If any biopsies were taken, your doctors clinic will contact you in 1 to 2   weeks with any results.  - Patient has a contact number available for emergencies.  The signs and   symptoms of potential delayed complications were discussed with the   patient.  Return to normal activities tomorrow.  Written discharge   instructions were provided to the patient.   - Soft diet today.   - Continue present medications.   - No aspirin, ibuprofen, naproxen, or other non-steroidal anti-inflammatory   drugs.   - Await pathology results.   - Discharge patient to home (ambulatory).   - Follow an antireflux regimen.   - Return to nurse practitioner in 6 weeks.  For questions, problems or results please call your physician - Raffi Elizabeth MD at Work:  [phone].  [facility], EMERGENCY ROOM PHONE NUMBER: [phone]  IF A COMPLICATION OR EMERGENCY SITUATION ARISES AND YOU ARE UNABLE TO REACH   YOUR PHYSICIAN - GO DIRECTLY TO THE EMERGENCY ROOM.  Raffi Elizabeth MD  2/11/2022 12:26:23 PM  This report has been verified and signed electronically.  Dear patient,  As a result of recent federal legislation (The Federal Cures Act), you may   receive lab or pathology results from your procedure in your [facility]   account before your physician is able to contact you. Your physician or   their representative will relay the results to you with their   recommendations at their soonest availability.  Thank you,  PROVATION

## 2022-02-11 NOTE — DISCHARGE INSTRUCTIONS
"Patient Education       Gastritis   The Basics   Written by the doctors and editors at [facility]   What is gastritis? -- "Gastritis" means inflammation of the stomach lining (figure 1).  Some people have gastritis that comes on suddenly and lasts only for a short time. Doctors call this "acute" gastritis. Other people have gastritis that lasts for months or years. Doctors call this "chronic" gastritis.  What causes gastritis? -- Different things can cause gastritis, including:  · An infection in the stomach from bacteria called "H. pylori"  · Medicines called "nonsteroidal antiinflammatory drugs" (NSAIDs) - These include aspirin, ibuprofen (brand names: Advil, Motrin), and naproxen (brand names: Aleve, Naprosyn).  · Drinking alcohol  · Conditions in which the body's infection-fighting system attacks the stomach lining  · Having a serious or life-threatening illness  What are the symptoms of gastritis? -- People with gastritis have no symptoms. When people do have symptoms, they are due to other conditions that can happen with gastritis, like ulcers. Symptoms from ulcers include:  · Pain in the upper belly  · Feeling bloated, or feeling full after eating a small amount of food  · Decreased appetite  · Nausea or vomiting  · Vomiting blood, or having black-colored bowel movements  · Feeling more tired than usual - This happens if people with gastritis get a condition called "anemia."  Should I call my doctor or nurse? -- Call your doctor or nurse if:  · You have belly pain that gets worse or doesn't go away  · You vomit blood or have black bowel movements  · You are losing weight (without trying to)  Will I need tests? -- Probably. Your doctor or nurse will ask about your symptoms and do an exam. They might also do:  · An upper endoscopy - During this procedure, the doctor puts a thin tube with a camera on the end into your mouth and down into your stomach (figure 2). they will look at the inside of your stomach. During " the procedure, they might also do a test called a biopsy. For a biopsy, the doctor takes a small sample of the stomach lining. Then another doctor looks at the sample under a microscope.  · Tests to check for H. pylori infection. These can include:  ? Blood tests  ? Breath tests - These tests measure substances in your breath after you drink a special liquid.  ? Tests on a small sample of your bowel movement  · Blood tests to check for anemia  How is gastritis treated? -- Treatment depends on what's causing your gastritis.  For example, if NSAIDs are causing your gastritis, your doctor will recommend that you not take those medicines. If alcohol is causing your gastritis, they will recommend that you stop drinking alcohol.  Doctors can use medicines to treat gastritis caused by an H. pylori infection. Most people take 3 or more medicines for 2 weeks. The treatment includes antibiotics plus medicine that helps the stomach make less acid.  Doctors can use medicines that reduce or block stomach acid to treat other causes of gastritis (table 1). The main types of medicines that reduce or block stomach acid are:  · Antacids  · Surface agents  · Histamine blockers  · Proton pump inhibitors  If your doctor recommends acid-reducing treatment, they will tell you which medicine to use.  What happens after treatment? -- Sometimes, people who are treated for an H. pylori infection need follow-up tests to make sure the infection is gone. Follow-up tests include breath tests, lab tests on a sample of bowel movement, or endoscopy.  All topics are updated as new evidence becomes available and our peer review process is complete.  This topic retrieved from [employer] on: Sep 21, 2021.  Topic 00478 Version 8.0  Release: 29.4.2 - C29.263  © 2021 UpToDate, Inc. and/or its affiliates. All rights reserved.  figure 1: Upper digestive tract     The upper digestive tract includes the esophagus (the tube that connects the mouth to the  stomach), the stomach, and the duodenum (the first part of the small intestine).  Graphic 43093 Version 6.0    figure 2: Upper endoscopy     During an upper endoscopy, you lie down and the doctor puts a thin tube with a camera and light on the end (called an endoscope) into your mouth and down into your esophagus, stomach, and duodenum (the first part of your small intestine). The camera sends pictures from inside your body to a television screen. That way, your doctor can see the inside of your esophagus, stomach, and duodenum.  Graphic 92618 Version 4.0    table 1: Medicines used to reduce stomach acid  Medicine type  Medicine name examples    Antacids* Calcium carbonate (sample brand names: Maalox, Tums)    Aluminum hydroxide, magnesium hydroxide, and simethicone (sample brand name: Mylanta)   Surface agents Sucralfate (brand name: Carafate)   Histamine blockers¶  Famotidine (brand name: Pepcid)    Cimetidine (brand name: Tagamet)   Proton pump inhibitors Omeprazole (brand name: Prilosec)    Esomeprazole (brand name: Nexium)    Pantoprazole (brand name: Protonix)    Lansoprazole (brand name: Prevacid)    Dexlansoprazole (brand name: Dexilant)    Rabeprazole (brand name: AcipHex)   Graphic 87773 Version 14.0  Consumer Information Use and Disclaimer   This information is not specific medical advice and does not replace information you receive from your health care provider. This is only a brief summary of general information. It does NOT include all information about conditions, illnesses, injuries, tests, procedures, treatments, therapies, discharge instructions or life-style choices that may apply to you. You must talk with your health care provider for complete information about your health and treatment options. This information should not be used to decide whether or not to accept your health care provider's advice, instructions or recommendations. Only your health care provider has the knowledge and training  to provide advice that is right for you. The use of this information is governed by the [employer] End User License Agreement, available at https://www.Clickpass.Clipboard/en/solutions/[place]/about/jaxon.The use of [employer] content is governed by the [employer] Terms of Use. ©2021 [employer] All rights reserved.  Copyright   © 2021 UpToDate, Inc. and/or its affiliates. All rights reserved.

## 2022-02-14 VITALS
SYSTOLIC BLOOD PRESSURE: 113 MMHG | DIASTOLIC BLOOD PRESSURE: 65 MMHG | OXYGEN SATURATION: 98 % | TEMPERATURE: 99 F | HEART RATE: 69 BPM | RESPIRATION RATE: 16 BRPM

## 2022-02-17 LAB
FINAL PATHOLOGIC DIAGNOSIS: NORMAL
GROSS: NORMAL
Lab: NORMAL

## 2022-02-18 ENCOUNTER — TELEPHONE (OUTPATIENT)
Dept: GASTROENTEROLOGY | Facility: CLINIC | Age: 27
End: 2022-02-18
Payer: COMMERCIAL

## 2022-02-18 RX ORDER — FLUTICASONE PROPIONATE 110 UG/1
2 AEROSOL, METERED RESPIRATORY (INHALATION) 2 TIMES DAILY
Qty: 12 G | Refills: 3 | Status: SHIPPED | OUTPATIENT
Start: 2022-02-18 | End: 2022-04-03 | Stop reason: SDUPTHER

## 2022-02-22 ENCOUNTER — TELEPHONE (OUTPATIENT)
Dept: GASTROENTEROLOGY | Facility: CLINIC | Age: 27
End: 2022-02-22
Payer: COMMERCIAL

## 2022-02-22 NOTE — TELEPHONE ENCOUNTER
----- Message from Claire Holcomb sent at 2/22/2022 12:40 PM CST -----  Type: Needs Medical Advice  Who Called:  PT  Symptoms (please be specific):  pt is asking for information on his fluticasone propionate (FLOVENT HFA) 110 mcg/actuation inhaler    Best Call Back Number: [phone]  Additional Information: Please advise

## 2022-03-16 ENCOUNTER — OFFICE VISIT (OUTPATIENT)
Dept: GASTROENTEROLOGY | Facility: CLINIC | Age: 27
End: 2022-03-16
Payer: COMMERCIAL

## 2022-03-16 VITALS
WEIGHT: 195.31 LBS | HEART RATE: 78 BPM | DIASTOLIC BLOOD PRESSURE: 68 MMHG | SYSTOLIC BLOOD PRESSURE: 130 MMHG | BODY MASS INDEX: 24.28 KG/M2 | HEIGHT: 75 IN

## 2022-03-16 DIAGNOSIS — K20.0 EOSINOPHILIC ESOPHAGITIS: Primary | ICD-10-CM

## 2022-03-16 DIAGNOSIS — K20.0 EOSINOPHILIC ESOPHAGITIS: ICD-10-CM

## 2022-03-16 DIAGNOSIS — R13.14 PHARYNGOESOPHAGEAL DYSPHAGIA: Primary | ICD-10-CM

## 2022-03-16 PROCEDURE — 99999 PR PBB SHADOW E&M-EST. PATIENT-LVL III: ICD-10-PCS | Mod: PBBFAC,,, | Performed by: INTERNAL MEDICINE

## 2022-03-16 PROCEDURE — 99999 PR PBB SHADOW E&M-EST. PATIENT-LVL III: CPT | Mod: PBBFAC,,, | Performed by: INTERNAL MEDICINE

## 2022-03-16 PROCEDURE — 99214 PR OFFICE/OUTPT VISIT, EST, LEVL IV, 30-39 MIN: ICD-10-PCS | Mod: S$GLB,,, | Performed by: INTERNAL MEDICINE

## 2022-03-16 PROCEDURE — 99214 OFFICE O/P EST MOD 30 MIN: CPT | Mod: S$GLB,,, | Performed by: INTERNAL MEDICINE

## 2022-03-16 NOTE — PROGRESS NOTES
"Subjective:       Patient ID: Andreas Rogers is a 26 y.o. male.    This is an established patient.      Chief Complaint: Eosinophilic esophagitis    Patient seen for dysphagia, occurring with solid foods, frequency often, alleviated/exacerbated by nothing in particular, associated signs/symptoms including none, onset remote.  He had EGD which revealed EoE.  He has since been treated with fluticasone and PPI and notes complete resolution of symptoms.  No other complaints currently.      Review of Systems   HENT: Positive for trouble swallowing (now resolved on therapy).    Respiratory: Negative for shortness of breath and wheezing.    Cardiovascular: Negative for palpitations.   Gastrointestinal: Negative for constipation and diarrhea.   Integumentary:  Negative for color change.   Neurological: Negative for dizziness.   Psychiatric/Behavioral: Negative for confusion. The patient is not nervous/anxious.    All other systems reviewed and are negative.        Objective:       Vitals:    03/16/22 1603   BP: 130/68   Pulse: 78   Weight: 88.6 kg (195 lb 5.2 oz)   Height: 6' 3" (1.905 m)       Physical Exam  Constitutional:       Appearance: He is well-developed.   HENT:      Head: Normocephalic and atraumatic.   Eyes:      General: No scleral icterus.     Pupils: Pupils are equal, round, and reactive to light.   Neck:      Thyroid: No thyromegaly.   Cardiovascular:      Rate and Rhythm: Normal rate and regular rhythm.      Heart sounds: No murmur heard.  Pulmonary:      Effort: Pulmonary effort is normal.      Breath sounds: Normal breath sounds. No wheezing.   Abdominal:      General: Bowel sounds are normal. There is no distension.      Palpations: Abdomen is soft.      Tenderness: There is no abdominal tenderness.   Musculoskeletal:      Cervical back: Normal range of motion and neck supple.   Lymphadenopathy:      Cervical: No cervical adenopathy.   Skin:     General: Skin is warm and dry.      Findings: No " erythema or rash.   Neurological:      Mental Status: He is alert and oriented to person, place, and time.   Psychiatric:         Behavior: Behavior normal.           CMP  Sodium   Date Value Ref Range Status   01/25/2022 141 136 - 145 mmol/L Final     Potassium   Date Value Ref Range Status   01/25/2022 4.0 3.5 - 5.1 mmol/L Final     Chloride   Date Value Ref Range Status   01/25/2022 105 95 - 110 mmol/L Final     CO2   Date Value Ref Range Status   01/25/2022 25 23 - 29 mmol/L Final     Glucose   Date Value Ref Range Status   01/25/2022 100 70 - 110 mg/dL Final     BUN   Date Value Ref Range Status   01/25/2022 15 6 - 20 mg/dL Final     Creatinine   Date Value Ref Range Status   01/25/2022 1.1 0.5 - 1.4 mg/dL Final     Calcium   Date Value Ref Range Status   01/25/2022 9.9 8.7 - 10.5 mg/dL Final     Total Protein   Date Value Ref Range Status   01/25/2022 7.4 6.0 - 8.4 g/dL Final     Albumin   Date Value Ref Range Status   01/25/2022 4.6 3.5 - 5.2 g/dL Final     Total Bilirubin   Date Value Ref Range Status   01/25/2022 0.6 0.1 - 1.0 mg/dL Final     Comment:     For infants and newborns, interpretation of results should be based  on gestational age, weight and in agreement with clinical  observations.    Premature Infant recommended reference ranges:  Up to 24 hours.............<8.0 mg/dL  Up to 48 hours............<12.0 mg/dL  3-5 days..................<15.0 mg/dL  6-29 days.................<15.0 mg/dL       Alkaline Phosphatase   Date Value Ref Range Status   01/25/2022 70 55 - 135 U/L Final     AST   Date Value Ref Range Status   01/25/2022 15 10 - 40 U/L Final     ALT   Date Value Ref Range Status   01/25/2022 20 10 - 44 U/L Final     Anion Gap   Date Value Ref Range Status   01/25/2022 11 8 - 16 mmol/L Final     eGFR if    Date Value Ref Range Status   01/25/2022 >60 >60 mL/min/1.73 m^2 Final     eGFR if non    Date Value Ref Range Status   01/25/2022 >60 >60 mL/min/1.73 m^2  Final     Comment:     Calculation used to obtain the estimated glomerular filtration  rate (eGFR) is the CKD-EPI equation.        Lab Results   Component Value Date    WBC 5.65 01/25/2022    HGB 15.0 01/25/2022    HCT 45.1 01/25/2022    MCV 92 01/25/2022     01/25/2022         Assessment:       Problem List Items Addressed This Visit     Pharyngoesophageal dysphagia - Primary      Other Visit Diagnoses     Eosinophilic esophagitis              Plan:       1.  Continue swallowed fluticasone 2 puffs BID  2.  Allergy referral per patient request  3.  Continue PPI  4.  Follow up in 6 months.

## 2022-04-04 RX ORDER — FLUTICASONE PROPIONATE 110 UG/1
2 AEROSOL, METERED RESPIRATORY (INHALATION) 2 TIMES DAILY
Qty: 12 G | Refills: 3 | Status: SHIPPED | OUTPATIENT
Start: 2022-04-04 | End: 2022-10-31 | Stop reason: SDUPTHER

## 2022-05-12 ENCOUNTER — OFFICE VISIT (OUTPATIENT)
Dept: ALLERGY | Facility: CLINIC | Age: 27
End: 2022-05-12
Payer: COMMERCIAL

## 2022-05-12 ENCOUNTER — LAB VISIT (OUTPATIENT)
Dept: LAB | Facility: HOSPITAL | Age: 27
End: 2022-05-12
Attending: FAMILY MEDICINE
Payer: COMMERCIAL

## 2022-05-12 VITALS — HEIGHT: 75 IN | BODY MASS INDEX: 23.96 KG/M2 | HEART RATE: 63 BPM | WEIGHT: 192.69 LBS | OXYGEN SATURATION: 98 %

## 2022-05-12 DIAGNOSIS — J31.0 RHINITIS, UNSPECIFIED TYPE: ICD-10-CM

## 2022-05-12 DIAGNOSIS — T78.1XXA POLLEN-FOOD ALLERGY, INITIAL ENCOUNTER: Primary | ICD-10-CM

## 2022-05-12 DIAGNOSIS — K20.0 EOSINOPHILIC ESOPHAGITIS: ICD-10-CM

## 2022-05-12 DIAGNOSIS — T78.1XXA POLLEN-FOOD ALLERGY, INITIAL ENCOUNTER: ICD-10-CM

## 2022-05-12 PROCEDURE — 99204 OFFICE O/P NEW MOD 45 MIN: CPT | Mod: S$GLB,,, | Performed by: ALLERGY & IMMUNOLOGY

## 2022-05-12 PROCEDURE — 86003 ALLG SPEC IGE CRUDE XTRC EA: CPT | Mod: 59 | Performed by: ALLERGY & IMMUNOLOGY

## 2022-05-12 PROCEDURE — 86003 ALLG SPEC IGE CRUDE XTRC EA: CPT | Performed by: ALLERGY & IMMUNOLOGY

## 2022-05-12 PROCEDURE — 99204 PR OFFICE/OUTPT VISIT, NEW, LEVL IV, 45-59 MIN: ICD-10-PCS | Mod: S$GLB,,, | Performed by: ALLERGY & IMMUNOLOGY

## 2022-05-12 PROCEDURE — 99999 PR PBB SHADOW E&M-EST. PATIENT-LVL III: ICD-10-PCS | Mod: PBBFAC,,, | Performed by: ALLERGY & IMMUNOLOGY

## 2022-05-12 PROCEDURE — 99999 PR PBB SHADOW E&M-EST. PATIENT-LVL III: CPT | Mod: PBBFAC,,, | Performed by: ALLERGY & IMMUNOLOGY

## 2022-05-12 PROCEDURE — 82785 ASSAY OF IGE: CPT | Performed by: ALLERGY & IMMUNOLOGY

## 2022-05-12 PROCEDURE — 36415 COLL VENOUS BLD VENIPUNCTURE: CPT | Mod: PO | Performed by: ALLERGY & IMMUNOLOGY

## 2022-05-12 PROCEDURE — 86008 ALLG SPEC IGE RECOMB EA: CPT | Mod: 59 | Performed by: ALLERGY & IMMUNOLOGY

## 2022-05-12 NOTE — PROGRESS NOTES
ALLERGY & IMMUNOLOGY CLINIC -  INITIAL CONSULTATION       HISTORY OF PRESENT ILLNESS     Patient ID: Andreas Rogers is a 26 y.o. male    CC: EoE    HPI: 25 yo man presents for initial evaluation. He is referred by Dr. Sawyer for EoE.     He has a history of obstruction and regurgitation of food, especially meat. He never had choking or difficulty breathing.     He underwent endoscopsy and esophageal dilation with Dr. Sawyer in February.   - Esophageal mucosal changes suggestive of                          eosinophilic esophagitis. Biopsied.                          - Mild Schatzki ring. Dilated.                          - Z-line regular, 43 cm from the incisors.                          - Medium-sized hiatal hernia.                          - Erythematous mucosa in the antrum. Biopsied.                          - Normal examined duodenum.     Pathology report shows Active esophagitis with markedly increased eosinophils (average 50/HPF) and   rare micro abscesses, consistent with eosinophilic esophagitis.     In February, he started flovent, at first it was 2 sprays swallowed twice a day, recently he cut this back to 1 spray twice a day. He understands the spray and swallow instructions. He also started prilosec daily in the morning. He takes it with food.     Concern for Peanut allergy: Since he was younger, he has avoided peanut because Peanut made his throat scratchy as a child. A few months ago, he ate a cookie that contained peanuts. He again developed scratchy throat and lip swelling within a few minutes of ingestion. He did not take any medications, symptoms resolved after about one hour.     He has frequent post-nasal drip, some sneezing, especially when the weather changes.     PCN: hives. Reaction occurred as a young child. He may have had amoxicillin without issue a few years ago.      REVIEW OF SYSTEMS     CONST: no F/C/NS, no unintentional weight changes  NEURO: no H/A, no weakness, no  "paresthesias  EYES: no discharge, no pruritus, no erythema  EARS: no hearing loss, no sensation of fullness  NOSE: no congestion, no rhinorrhea, no itching, no sneezing  PULM: no SOB, no wheezing, no cough  CV: no CP, no palpitations, no leg swelling  GI: no dysphagia, no heartburn, no pain, no N/V/D  MSK: no joint pain, no muscle pain  DERM: no rashes, no skin breaks     MEDICAL HISTORY     MedHx: active problems reviewed  SurgHx: EGD + dilation  FamHx: no one with h/o EoE or dilations  Allergies: PCN  Medications: MAR reviewed     PHYSICAL EXAM     VS: Pulse 63   Ht 6' 3" (1.905 m)   Wt 87.4 kg (192 lb 10.9 oz)   SpO2 98%   BMI 24.08 kg/m²   GEN: Alert, cooperative, normal speech  HEENT: No ocular discharge, no nasal discharge, no hoarseness  PULM: Normal work of breathing, no cough  PSYCH: appropriate affect     LABORATORY STUDIES          ALLERGEN TESTING     Never done       ASSESSMENT & PLAN     Andreas Rogers is a 26 y.o. male with     Eosinophilic esophagitis, symptoms improving s/p dilation and while on PPI + swallowed fluticasone.   Rhinitis  Possible oral allergy syndrome vs IgE-mediated peanut allergy  PCN allergy    Plan;   Reviewed pathophysiology, natural history, treatment options, and prognosis of this condition. Would continue flovent, low threshhold to increase back up to 2 puffs swallowed BID if symptoms return.   Recommend taking PPI on empty stomach.   Immunocaps to aeroallergens and peanut.   Recommend amoxicillin challenge if he cannot find pharmacy records of any prior amoxicillin.    Follow up: will reach out via message or phone with results when available.     Norma Upton MD  Allergy/Immunology      "

## 2022-05-13 LAB — IGE SERPL-ACNC: 139 IU/ML (ref 0–100)

## 2022-05-16 LAB
A ALTERNATA IGE QN: 0.12 KU/L
A FUMIGATUS IGE QN: <0.1 KU/L
ALLERGY INTERPRETATION: ABNORMAL
BERMUDA GRASS IGE QN: 0.56 KU/L
CAT DANDER IGE QN: 0.37 KU/L
CEDAR IGE QN: 0.11 KU/L
D FARINAE IGE QN: 18.5 KU/L
D PTERONYSS IGE QN: 8.31 KU/L
DEPRECATED A ALTERNATA IGE RAST QL: ABNORMAL
DEPRECATED A FUMIGATUS IGE RAST QL: NORMAL
DEPRECATED BERMUDA GRASS IGE RAST QL: ABNORMAL
DEPRECATED CAT DANDER IGE RAST QL: ABNORMAL
DEPRECATED CEDAR IGE RAST QL: ABNORMAL
DEPRECATED D FARINAE IGE RAST QL: ABNORMAL
DEPRECATED D PTERONYSS IGE RAST QL: ABNORMAL
DEPRECATED DOG DANDER IGE RAST QL: ABNORMAL
DEPRECATED ELDER IGE RAST QL: ABNORMAL
DEPRECATED ENGL PLANTAIN IGE RAST QL: ABNORMAL
DEPRECATED PEANUT (RARA H) 2 IGE RAST QL: ABNORMAL
DEPRECATED PEANUT (RARA H) 2 IGE RAST QL: ABNORMAL
DEPRECATED PEANUT (RARA H) 3 IGE RAST QL: ABNORMAL
DEPRECATED PEANUT (RARA H) 6 IGE RAST QL: ABNORMAL
DEPRECATED PEANUT (RARA H) 8 IGE RAST QL: ABNORMAL
DEPRECATED PECAN/HICK TREE IGE RAST QL: ABNORMAL
DEPRECATED ROACH IGE RAST QL: NORMAL
DEPRECATED SILVER BIRCH IGE RAST QL: ABNORMAL
DEPRECATED TIMOTHY IGE RAST QL: ABNORMAL
DEPRECATED WEST RAGWEED IGE RAST QL: ABNORMAL
DEPRECATED WHITE OAK IGE RAST QL: ABNORMAL
DOG DANDER IGE QN: 0.37 KU/L
ELDER IGE QN: 0.58 KU/L
ENGL PLANTAIN IGE QN: 0.79 KU/L
PEANUT (RARA H) 1 IGE QN: 0.18 KU/L
PEANUT (RARA H) 2 IGE QN: 0.46 KU/L
PEANUT (RARA H) 3 IGE QN: <0.1 KU/L
PEANUT (RARA H) 6 IGE QN: 0.31 KU/L
PEANUT (RARA H) 8 IGE QN: <0.1 KU/L
PEANUT (RARA H) 9 IGE QN: 0.26 KU/L
PEANUT (RARA H) 9 IGE QN: ABNORMAL
PECAN/HICK TREE IGE QN: 0.97 KU/L
ROACH IGE QN: <0.1 KU/L
SILVER BIRCH IGE QN: 0.36 KU/L
TIMOTHY IGE QN: 1.55 KU/L
WEST RAGWEED IGE QN: 0.53 KU/L
WHITE OAK IGE QN: 0.63 KU/L

## 2022-05-17 ENCOUNTER — PATIENT MESSAGE (OUTPATIENT)
Dept: ALLERGY | Facility: CLINIC | Age: 27
End: 2022-05-17
Payer: COMMERCIAL

## 2022-10-31 RX ORDER — FLUTICASONE PROPIONATE 110 UG/1
2 AEROSOL, METERED RESPIRATORY (INHALATION) 2 TIMES DAILY
Qty: 12 G | Refills: 3 | Status: SHIPPED | OUTPATIENT
Start: 2022-10-31 | End: 2023-06-02 | Stop reason: SDUPTHER

## 2023-06-02 RX ORDER — FLUTICASONE PROPIONATE 110 UG/1
2 AEROSOL, METERED RESPIRATORY (INHALATION) 2 TIMES DAILY
Qty: 12 G | Refills: 3 | Status: SHIPPED | OUTPATIENT
Start: 2023-06-02 | End: 2023-11-22 | Stop reason: SDUPTHER

## 2023-11-22 RX ORDER — FLUTICASONE PROPIONATE 110 UG/1
2 AEROSOL, METERED RESPIRATORY (INHALATION) 2 TIMES DAILY
Qty: 12 G | Refills: 3 | Status: SHIPPED | OUTPATIENT
Start: 2023-11-22 | End: 2024-02-01 | Stop reason: SDUPTHER

## 2024-02-02 RX ORDER — FLUTICASONE PROPIONATE 110 UG/1
2 AEROSOL, METERED RESPIRATORY (INHALATION) 2 TIMES DAILY
Qty: 12 G | Refills: 3 | Status: SHIPPED | OUTPATIENT
Start: 2024-02-02 | End: 2024-05-02

## 2024-12-12 RX ORDER — FLUTICASONE PROPIONATE 110 UG/1
2 AEROSOL, METERED RESPIRATORY (INHALATION) 2 TIMES DAILY
Qty: 12 G | Refills: 3 | OUTPATIENT
Start: 2024-12-12 | End: 2025-03-12

## 2025-01-13 RX ORDER — FLUTICASONE PROPIONATE 110 UG/1
2 AEROSOL, METERED RESPIRATORY (INHALATION) 2 TIMES DAILY
Qty: 12 G | Refills: 3 | Status: SHIPPED | OUTPATIENT
Start: 2025-01-13 | End: 2025-04-13

## 2025-01-16 ENCOUNTER — TELEPHONE (OUTPATIENT)
Dept: GASTROENTEROLOGY | Facility: CLINIC | Age: 30
End: 2025-01-16
Payer: COMMERCIAL

## 2025-03-18 RX ORDER — FLUTICASONE PROPIONATE 110 UG/1
2 AEROSOL, METERED RESPIRATORY (INHALATION) 2 TIMES DAILY
Qty: 12 G | Refills: 3 | Status: SHIPPED | OUTPATIENT
Start: 2025-03-18 | End: 2025-06-16